# Patient Record
Sex: MALE | Race: WHITE | NOT HISPANIC OR LATINO | Employment: FULL TIME | URBAN - METROPOLITAN AREA
[De-identification: names, ages, dates, MRNs, and addresses within clinical notes are randomized per-mention and may not be internally consistent; named-entity substitution may affect disease eponyms.]

---

## 2023-12-12 ENCOUNTER — OFFICE VISIT (OUTPATIENT)
Dept: GASTROENTEROLOGY | Facility: CLINIC | Age: 28
End: 2023-12-12
Payer: MEDICAID

## 2023-12-12 VITALS
WEIGHT: 223.8 LBS | HEART RATE: 71 BPM | SYSTOLIC BLOOD PRESSURE: 110 MMHG | BODY MASS INDEX: 33.92 KG/M2 | HEIGHT: 68 IN | DIASTOLIC BLOOD PRESSURE: 80 MMHG

## 2023-12-12 DIAGNOSIS — R11.0 NAUSEA: Primary | ICD-10-CM

## 2023-12-12 DIAGNOSIS — R12 HEARTBURN: ICD-10-CM

## 2023-12-12 RX ORDER — SPIRONOLACTONE 50 MG/1
50 TABLET, FILM COATED ORAL
COMMUNITY
Start: 2023-11-25

## 2023-12-12 RX ORDER — ESOMEPRAZOLE MAGNESIUM 40 MG/1
40 CAPSULE, DELAYED RELEASE ORAL DAILY
Qty: 90 CAPSULE | Refills: 3 | Status: SHIPPED | OUTPATIENT
Start: 2023-12-12

## 2023-12-12 RX ORDER — ESTRADIOL 2 MG/1
TABLET ORAL
COMMUNITY
Start: 2023-11-25

## 2023-12-12 NOTE — PATIENT INSTRUCTIONS
Follow a diet as recommended by your health care provider. This may involve avoiding foods and drinks such as:  Coffee and tea (with or without caffeine).  Drinks that contain alcohol.  Energy drinks and sports drinks.  Carbonated drinks or sodas.  Chocolate and cocoa.  Peppermint and mint flavorings.  Garlic and onions.  Horseradish.  Spicy and acidic foods, including peppers, chili powder, armijo powder, vinegar, hot sauces, and barbecue sauce.  Citrus fruit juices and citrus fruits, such as oranges, guy, and limes.  Tomato-based foods, such as red sauce, chili, salsa, and pizza with red sauce.  Fried and fatty foods, such as donuts, french fries, potato chips, and high-fat dressings.    Eat small, frequent meals instead of large meals.  Avoid drinking large amounts of liquid with your meals.  Avoid eating meals during the 2-3 hours before bedtime.  Avoid lying down right after you eat.    Take esomeprazole 30 minutes prior to evening meal.

## 2023-12-12 NOTE — PROGRESS NOTES
GASTROENTEROLOGY OFFICE NOTE    Gabriel Thurston  [id number]  1995    CARE TEAM  Patient Care Team:  Provider, No Known as PCP - General    Referring Provider: No ref. provider found    Chief Complaint   Patient presents with    Vomiting     New patient in with c/o chronic vomiting x5yrs. States it's worsened over the years. Says usually mostly saliva that he throws up, worse after eating.         HISTORY OF PRESENT ILLNESS:   Gabriel Thurston is a 28 y.o. adult who is undergoing treatment at  for gender dysphoria, history of depression, obsessive-compulsive disorder, anxiety who presents to the clinic today as a self referral due to nausea that seems to be aggravated by standing after eating, anxiety, physical activity.  Reports intermittently feeling an abnormal sensation in chest as well as intermittently produces a white frothy substance in the morning.    Started taking an over-the-counter antacid that is believed to be esomeprazole 20 mg in the morning for approximately 4 to 6 weeks with report of 30% improvement in symptoms. Healthier food choices, decreased alcohol intake and avoiding nicotine possibly also helpful.    Reports history of blood per rectum but reports resolution of blood per rectum possibly related to healthier eating. Reports bowel movement most days without sensation of incomplete evacuation.    Past Medical History:   Diagnosis Date    Anxiety     Depression     Gender dysphoria     GERD (gastroesophageal reflux disease)     Obsessive compulsive disorder         Past Surgical History:   Procedure Laterality Date    KNEE SURGERY Right         Current Outpatient Medications on File Prior to Visit   Medication Sig    estradiol (ESTRACE) 2 MG tablet 1 tab PO BID    spironolactone (ALDACTONE) 50 MG tablet Take 1 tablet by mouth.    [DISCONTINUED] FLUoxetine (PROzac) 20 MG capsule      No current facility-administered medications on file prior to visit.       Allergies  "  Allergen Reactions    Ibuprofen Hives       History reviewed. No pertinent family history.    Social History     Socioeconomic History    Marital status: Single   Tobacco Use    Smoking status: Never    Smokeless tobacco: Never   Vaping Use    Vaping Use: Never used   Substance and Sexual Activity    Alcohol use: Yes     Comment: occasional    Drug use: Never    Sexual activity: Defer       PHYSICAL EXAM   /80 (BP Location: Left arm, Patient Position: Sitting, Cuff Size: Adult)   Pulse 71   Ht 172.7 cm (68\")   Wt 102 kg (223 lb 12.8 oz)   BMI 34.03 kg/m²   Physical Exam  Constitutional:       General: She is not in acute distress.     Appearance: She is not toxic-appearing.   HENT:      Head: Normocephalic and atraumatic. No contusion.      Right Ear: External ear normal.      Left Ear: External ear normal.   Eyes:      General: Lids are normal. No scleral icterus.        Right eye: No discharge.         Left eye: No discharge.      Extraocular Movements: Extraocular movements intact.   Neck:      Trachea: Trachea normal.      Comments: No visible mass  No visible adenopathy  Cardiovascular:      Rate and Rhythm: Normal rate.   Pulmonary:      Effort: No respiratory distress.      Comments: Symmetrical expansion    Abdominal:      Palpations: Abdomen is soft. There is no mass.      Tenderness: There is no abdominal tenderness.   Musculoskeletal:      Comments: Symmetrical movement of upper extremities  Symmetrical movement of lower extremities  No visible deformities   Skin:     General: Skin is warm and dry.      Coloration: Skin is not jaundiced.   Neurological:      General: No focal deficit present.      Mental Status: She is alert and oriented to person, place, and time.   Psychiatric:         Mood and Affect: Mood normal.         Behavior: Behavior normal.         Thought Content: Thought content normal.     Results Review:    11/13/2023 hepatitis C antibody negative, hepatitis B surface antibody, " Negative (not immune to hepatitis B), varicella IgG positive indicating prior infection or vaccination; hepatitis a antibody IgG positive.  Basic metabolic panel normal.    ASSESSMENT / PLAN  1. Nausea  2. Heartburn, possible reflux  -Plan for EGD for additional evaluation  -Some improvement taking esomeprazole 20 mg daily in the morning.  Recommend esomeprazole 40 mg 30 minutes prior to last meal of the day as well as lifestyle modifications for reflux  -Consider ultrasound gallbladder in the future but due to report of healthier eating habits, lower suspicion for symptomatic gallstones, biliary colic.  - esomeprazole (nexIUM) 40 MG capsule; Take 1 capsule by mouth Daily. 30 minutes prior to last meal of the day  Dispense: 90 capsule; Refill: 3      Return for Follow-up after EGD.    Gabby Quintana, APRN  12/12/2023

## 2024-01-08 ENCOUNTER — TELEPHONE (OUTPATIENT)
Dept: GASTROENTEROLOGY | Facility: CLINIC | Age: 29
End: 2024-01-08
Payer: MEDICAID

## 2024-01-08 NOTE — TELEPHONE ENCOUNTER
"  Hub staff attempted to follow warm transfer process and was unsuccessful     Caller: Gabriel Thurston \"Cale\"    Relationship to patient: Self    Best call back number: [phone]    Patient is needing: PATIENT CALLED IN REGARD TO HIS UPCOMING EGD SCHEDULED FOR 01/23/2024-PATIENT WOULD LIKE TO KNOW HOW LONG THE PROCEDURE WILL BE AND IF HE WOULD BE ABLE TO RETURN TO WORK ON THE SAME DAY. PATIENT WOULD LIKE CALL BACK TO BE ADVISED. OKAY TO CALL ANYTIME, OKAY TO LEAVE VM.        "

## 2024-01-23 ENCOUNTER — OUTSIDE FACILITY SERVICE (OUTPATIENT)
Dept: GASTROENTEROLOGY | Facility: CLINIC | Age: 29
End: 2024-01-23
Payer: MEDICAID

## 2024-01-23 PROCEDURE — 88305 TISSUE EXAM BY PATHOLOGIST: CPT | Performed by: INTERNAL MEDICINE

## 2024-01-24 ENCOUNTER — LAB REQUISITION (OUTPATIENT)
Dept: LAB | Facility: HOSPITAL | Age: 29
End: 2024-01-24
Payer: MEDICAID

## 2024-01-24 DIAGNOSIS — R11.0 NAUSEA: ICD-10-CM

## 2024-01-24 DIAGNOSIS — R63.4 ABNORMAL WEIGHT LOSS: ICD-10-CM

## 2024-01-25 LAB — REF LAB TEST METHOD: NORMAL

## 2024-02-13 ENCOUNTER — TELEPHONE (OUTPATIENT)
Dept: GASTROENTEROLOGY | Facility: CLINIC | Age: 29
End: 2024-02-13
Payer: MEDICAID

## 2024-02-15 ENCOUNTER — TELEMEDICINE (OUTPATIENT)
Dept: GASTROENTEROLOGY | Facility: CLINIC | Age: 29
End: 2024-02-15
Payer: MEDICAID

## 2024-02-15 DIAGNOSIS — Z87.898 HISTORY OF NAUSEA: Primary | ICD-10-CM

## 2024-02-15 DIAGNOSIS — R11.10 RUMINATION SYNDROME OF INGESTED FOOD IN ADULT: ICD-10-CM

## 2024-02-15 PROBLEM — S83.206A RIGHT KNEE MENISCAL TEAR: Status: ACTIVE | Noted: 2019-12-23

## 2024-02-15 PROBLEM — J30.2 SEASONAL ALLERGIES: Status: ACTIVE | Noted: 2020-03-20

## 2024-02-15 RX ORDER — FLUOXETINE HYDROCHLORIDE 60 MG/1
60 TABLET, FILM COATED ORAL; ORAL DAILY
COMMUNITY

## 2024-02-15 RX ORDER — BUSPIRONE HYDROCHLORIDE 5 MG/1
5 TABLET ORAL
COMMUNITY